# Patient Record
Sex: FEMALE | Race: ASIAN | NOT HISPANIC OR LATINO | Employment: FULL TIME | URBAN - METROPOLITAN AREA
[De-identification: names, ages, dates, MRNs, and addresses within clinical notes are randomized per-mention and may not be internally consistent; named-entity substitution may affect disease eponyms.]

---

## 2020-03-07 ENCOUNTER — OFFICE VISIT (OUTPATIENT)
Dept: URGENT CARE | Facility: CLINIC | Age: 35
End: 2020-03-07
Payer: COMMERCIAL

## 2020-03-07 VITALS
HEART RATE: 80 BPM | SYSTOLIC BLOOD PRESSURE: 135 MMHG | RESPIRATION RATE: 16 BRPM | DIASTOLIC BLOOD PRESSURE: 79 MMHG | WEIGHT: 143 LBS | TEMPERATURE: 98 F | OXYGEN SATURATION: 100 %

## 2020-03-07 DIAGNOSIS — B34.9 VIRAL ILLNESS: Primary | ICD-10-CM

## 2020-03-07 PROCEDURE — 99203 OFFICE O/P NEW LOW 30 MIN: CPT | Performed by: NURSE PRACTITIONER

## 2020-03-07 RX ORDER — AMOXICILLIN 250 MG/1
CAPSULE ORAL EVERY 8 HOURS SCHEDULED
COMMUNITY

## 2020-03-07 NOTE — PATIENT INSTRUCTIONS
You should not take antibiotics without prescription  Some of your gastro symptoms such as loose stools may be caused by antibiotics  Maintain adequate fluid intake  [place] diet  If your symptoms persist, consider following up with your PCP for labwork as discussed

## 2020-03-07 NOTE — PROGRESS NOTES
[facility]        NAME: Holli Holland is a 27 y o  female  : 1985    MRN: [id number]  DATE: 2020  TIME: 3:44 PM    Assessment and Plan   1  Viral illness  Appears to be resolving  Advised not to take abx without evaluation by medical provider  Monitor symptoms and follow up with pcp if not improving  Patient Instructions   You should not take antibiotics without prescription by medical provider  Some of your gastro symptoms such as loose stools may be caused by antibiotics  Maintain adequate fluid intake  [place] diet  If your symptoms persist, consider following up with your PCP for labwork as discussed  Chief Complaint     Chief Complaint   Patient presents with    Sore Throat     Pt reports having stomach ache and sore throat she also reports having loose stool x 3 over the last 5 days  She feels tired and dizzy  History of Present Illness       Symptoms for 5 days  Started with sore throat  "bought" amoxicillin otc in [facility]  Now with loose stools   Speaking minimal english  [facility]  No fever  Generally feeling unwell  No recent labs  No chest pain, sob  No n/v/d  Decreased appetite        Review of Systems   Review of Systems   Constitutional: Positive for appetite change and fatigue  Negative for fever  HENT: Positive for sore throat (improved)  Negative for congestion, postnasal drip, rhinorrhea, sinus pressure and sinus pain  Respiratory: Positive for cough (slight)  Negative for shortness of breath  Gastrointestinal: Positive for diarrhea (loose stools after starting abx  )  Negative for nausea and vomiting  Musculoskeletal: Negative for myalgias  Skin: Negative for rash  Neurological: Negative for dizziness and headaches  Hematological: Negative for adenopathy           Current Medications       Current Outpatient Medications:     amoxicillin (AMOXIL) 250 mg capsule, Take by mouth every 8 (eight) hours, Disp: , Rfl:    Tenofovir Alafenamide Fumarate 25 MG TABS, Take by mouth, Disp: , Rfl:     Current Allergies     Allergies as of 03/07/2020    (No Known Allergies)            The following portions of the patient's history were reviewed and updated as appropriate: allergies, current medications, past family history, past medical history, past social history, past surgical history and problem list      No past medical history on file  No past surgical history on file  No family history on file  Medications have been verified  Objective   /79   Pulse 80   Temp 98 °F (36 7 °C)   Resp 16   Wt 64 9 kg (143 lb)   SpO2 100%        Physical Exam     Physical Exam   Constitutional: She appears well-developed and well-nourished  She does not appear ill  HENT:   TMS WNL  Turbinates not inflamed  Oropharynx with no erythema or exudate  No sinus tenderness to palpation  (-) PND     Neck: Normal range of motion  Cardiovascular: Normal rate and regular rhythm  Pulmonary/Chest: Effort normal and breath sounds normal    Abdominal: Soft  Bowel sounds are normal  She exhibits no distension  There is no tenderness  Lymphadenopathy:     She has no cervical adenopathy  Neurological: She is alert  Skin: Skin is warm and dry  Vitals reviewed